# Patient Record
Sex: FEMALE | Race: WHITE | URBAN - METROPOLITAN AREA
[De-identification: names, ages, dates, MRNs, and addresses within clinical notes are randomized per-mention and may not be internally consistent; named-entity substitution may affect disease eponyms.]

---

## 2024-05-29 ENCOUNTER — HOSPITAL ENCOUNTER (OUTPATIENT)
Age: 29
Discharge: HOME | End: 2024-05-29

## 2024-05-29 LAB
HBV SURFACE AB SER QL IA: POSITIVE
RUBV IGG SERPL IA-ACNC: 47.5 IU/ML

## 2024-05-31 LAB
MEV IGG SER-ACNC: > 300 AU/ML
MUV IGG TITR SER: 1.17 {TITER}
VZV IGG SER QL IA: 1.21

## 2025-01-07 ENCOUNTER — HOSPITAL ENCOUNTER (EMERGENCY)
Age: 30
Discharge: HOME | End: 2025-01-07
Payer: COMMERCIAL

## 2025-01-07 VITALS
RESPIRATION RATE: 18 BRPM | BODY MASS INDEX: 33.28 KG/M2 | SYSTOLIC BLOOD PRESSURE: 141 MMHG | WEIGHT: 200 LBS | OXYGEN SATURATION: 98 % | TEMPERATURE: 98.3 F | DIASTOLIC BLOOD PRESSURE: 80 MMHG | HEART RATE: 106 BPM

## 2025-01-07 DIAGNOSIS — H61.23 BILATERAL IMPACTED CERUMEN: Primary | ICD-10-CM

## 2025-01-07 PROCEDURE — 99202 OFFICE O/P NEW SF 15 MIN: CPT | Performed by: NURSE PRACTITIONER

## 2025-01-07 PROCEDURE — 99203 OFFICE O/P NEW LOW 30 MIN: CPT

## 2025-01-07 ASSESSMENT — PAIN DESCRIPTION - LOCATION: LOCATION: EAR

## 2025-01-07 ASSESSMENT — ENCOUNTER SYMPTOMS
COUGH: 0
SORE THROAT: 0
SHORTNESS OF BREATH: 0
CHEST TIGHTNESS: 0
DIARRHEA: 0
RHINORRHEA: 0
NAUSEA: 0
VOMITING: 0

## 2025-01-07 ASSESSMENT — PAIN DESCRIPTION - DESCRIPTORS: DESCRIPTORS: ACHING;PRESSURE

## 2025-01-07 ASSESSMENT — PAIN - FUNCTIONAL ASSESSMENT: PAIN_FUNCTIONAL_ASSESSMENT: 0-10

## 2025-01-07 ASSESSMENT — PAIN DESCRIPTION - PAIN TYPE: TYPE: ACUTE PAIN

## 2025-01-07 ASSESSMENT — PAIN DESCRIPTION - FREQUENCY: FREQUENCY: INTERMITTENT

## 2025-01-07 ASSESSMENT — PAIN SCALES - GENERAL: PAINLEVEL_OUTOF10: 5

## 2025-01-07 ASSESSMENT — PAIN DESCRIPTION - ORIENTATION: ORIENTATION: LEFT

## 2025-01-07 NOTE — ED NOTES
Pt ambulatory to [facility] with c/o L ear pain. Reports feeling \"like it needs to pop\". Denies taking anything for symptoms. Denies fevers, cough, n/v/d, or congestion.      Ashley Jones RN  01/07/25 [id number]

## 2025-01-07 NOTE — ED NOTES
Pt ear irrigated at this time. Some cerumen removed at this time. Pt states \"oh! I can hear now!\" Cerumen still present in the ear. Pt wishing to have irrigation stopped at this time and to attempt OTC medication. Will speak with provider.      Ashley Jones RN  01/07/25 1600

## 2025-01-07 NOTE — ED PROVIDER NOTES
results found for this visit on 01/07/25.    IMAGING:    No orders to display         EKG: None      URGENT CARE COURSE:     Vitals:    01/07/25 1537   BP: (!) 141/80   Pulse: (!) 106   Resp: 18   Temp: 98.3 °F (36.8 °C)   TempSrc: Oral   SpO2: 98%   Weight: 90.7 kg (200 lb)       Medications - No data to display         PROCEDURES:  None    FINAL IMPRESSION      1. Bilateral impacted cerumen          DISPOSITION/ PLAN     Patient was seen and evaluated for the above symptoms.  Assessment reveals acute bilateral cerumen impaction.  Bilateral ear flush performed without complication. Bilateral TM intact. Instructed to use debrox solution OTC as needed for future symptoms of impaction.The Patient is instructed to use over-the-counter Tylenol and Motrin for pain.  Instructed to follow-up with their PCP or [facility] in 3 to 5 days and worsening symptoms.  The patient is agreeable with the above plan and denies questions or concerns at this time.         PATIENT REFERRED TO:  No primary care provider on file.  No primary physician on file.      DISCHARGE MEDICATIONS:  Discharge Medication List as of 1/7/2025  4:24 PM          Discharge Medication List as of 1/7/2025  4:24 PM          Discharge Medication List as of 1/7/2025  4:24 PM          ANNA Brumfield CNP    (Please note that portions of this note were completed with a voice recognition program. Efforts were made to edit the dictations but occasionally words are mis-transcribed.)           Jan Swenson APRN - CNP  01/07/25 1156